# Patient Record
Sex: MALE | Race: WHITE | NOT HISPANIC OR LATINO | URBAN - METROPOLITAN AREA
[De-identification: names, ages, dates, MRNs, and addresses within clinical notes are randomized per-mention and may not be internally consistent; named-entity substitution may affect disease eponyms.]

---

## 2020-04-21 DIAGNOSIS — Z01.84 ANTIBODY RESPONSE EXAMINATION: ICD-10-CM

## 2020-04-22 ENCOUNTER — LAB VISIT (OUTPATIENT)
Dept: LAB | Facility: HOSPITAL | Age: 45
End: 2020-04-22
Attending: INTERNAL MEDICINE
Payer: COMMERCIAL

## 2020-04-22 DIAGNOSIS — Z01.84 ANTIBODY RESPONSE EXAMINATION: ICD-10-CM

## 2020-04-22 LAB — SARS-COV-2 IGG SERPL QL IA: NEGATIVE

## 2020-04-22 PROCEDURE — 86769 SARS-COV-2 COVID-19 ANTIBODY: CPT

## 2020-04-22 PROCEDURE — 36415 COLL VENOUS BLD VENIPUNCTURE: CPT

## 2020-12-23 ENCOUNTER — IMMUNIZATION (OUTPATIENT)
Dept: INTERNAL MEDICINE | Facility: CLINIC | Age: 45
End: 2020-12-23
Payer: COMMERCIAL

## 2020-12-23 DIAGNOSIS — Z23 NEED FOR VACCINATION: ICD-10-CM

## 2020-12-23 PROCEDURE — 91300 COVID-19, MRNA, LNP-S, PF, 30 MCG/0.3 ML DOSE VACCINE: CPT | Mod: ,,, | Performed by: INTERNAL MEDICINE

## 2020-12-23 PROCEDURE — 91300 COVID-19, MRNA, LNP-S, PF, 30 MCG/0.3 ML DOSE VACCINE: ICD-10-PCS | Mod: ,,, | Performed by: INTERNAL MEDICINE

## 2020-12-23 PROCEDURE — 0001A COVID-19, MRNA, LNP-S, PF, 30 MCG/0.3 ML DOSE VACCINE: ICD-10-PCS | Mod: CV19,,, | Performed by: INTERNAL MEDICINE

## 2020-12-23 PROCEDURE — 0001A COVID-19, MRNA, LNP-S, PF, 30 MCG/0.3 ML DOSE VACCINE: CPT | Mod: CV19,,, | Performed by: INTERNAL MEDICINE

## 2021-01-08 ENCOUNTER — LAB VISIT (OUTPATIENT)
Dept: LAB | Facility: HOSPITAL | Age: 46
End: 2021-01-08
Attending: FAMILY MEDICINE
Payer: COMMERCIAL

## 2021-01-08 DIAGNOSIS — E78.5 DYSLIPIDEMIA: ICD-10-CM

## 2021-01-08 PROBLEM — J31.0 CHRONIC RHINITIS: Status: ACTIVE | Noted: 2018-12-18

## 2021-01-08 PROBLEM — E66.9 OBESITY: Status: ACTIVE | Noted: 2019-12-20

## 2021-01-08 LAB
CHOLEST SERPL-MCNC: 211 MG/DL (ref 120–199)
CHOLEST/HDLC SERPL: 5.7 {RATIO} (ref 2–5)
HDLC SERPL-MCNC: 37 MG/DL (ref 40–75)
HDLC SERPL: 17.5 % (ref 20–50)
LDLC SERPL CALC-MCNC: 136.4 MG/DL (ref 63–159)
NONHDLC SERPL-MCNC: 174 MG/DL
TRIGL SERPL-MCNC: 188 MG/DL (ref 30–150)

## 2021-01-08 PROCEDURE — 80061 LIPID PANEL: CPT

## 2021-01-08 PROCEDURE — 36415 COLL VENOUS BLD VENIPUNCTURE: CPT

## 2021-01-14 ENCOUNTER — IMMUNIZATION (OUTPATIENT)
Dept: INTERNAL MEDICINE | Facility: CLINIC | Age: 46
End: 2021-01-14
Payer: COMMERCIAL

## 2021-01-14 DIAGNOSIS — Z23 NEED FOR VACCINATION: ICD-10-CM

## 2021-01-14 PROCEDURE — 0002A COVID-19, MRNA, LNP-S, PF, 30 MCG/0.3 ML DOSE VACCINE: CPT | Mod: PBBFAC | Performed by: INTERNAL MEDICINE

## 2021-01-14 PROCEDURE — 91300 COVID-19, MRNA, LNP-S, PF, 30 MCG/0.3 ML DOSE VACCINE: CPT | Mod: PBBFAC | Performed by: INTERNAL MEDICINE

## 2021-08-26 ENCOUNTER — LAB VISIT (OUTPATIENT)
Dept: INTERNAL MEDICINE | Facility: CLINIC | Age: 46
End: 2021-08-26

## 2021-08-26 DIAGNOSIS — R68.83 CHILLS: Primary | ICD-10-CM

## 2021-08-26 DIAGNOSIS — R10.9 ABDOMINAL CRAMPING: ICD-10-CM

## 2021-08-26 DIAGNOSIS — R68.83 CHILLS: ICD-10-CM

## 2021-08-26 DIAGNOSIS — R50.9 FEVER, UNSPECIFIED FEVER CAUSE: ICD-10-CM

## 2021-08-26 LAB — SARS-COV-2 RDRP RESP QL NAA+PROBE: NEGATIVE

## 2021-08-26 PROCEDURE — U0002 COVID-19 LAB TEST NON-CDC: HCPCS | Performed by: PREVENTIVE MEDICINE

## 2022-01-15 ENCOUNTER — LAB VISIT (OUTPATIENT)
Dept: FAMILY MEDICINE | Facility: CLINIC | Age: 47
End: 2022-01-15
Payer: COMMERCIAL

## 2022-01-15 DIAGNOSIS — Z11.52 ENCOUNTER FOR SCREENING FOR SEVERE ACUTE RESPIRATORY SYNDROME CORONAVIRUS 2 (SARS-COV-2) INFECTION: Primary | ICD-10-CM

## 2022-01-15 LAB
CTP QC/QA: YES
SARS-COV-2 AG RESP QL IA.RAPID: NEGATIVE

## 2022-01-15 PROCEDURE — 87811 SARS-COV-2 COVID19 W/OPTIC: CPT | Mod: S$GLB,,, | Performed by: FAMILY MEDICINE

## 2022-01-15 PROCEDURE — 87811 SARS CORONAVIRUS 2 ANTIGEN POCT, MANUAL READ: ICD-10-PCS | Mod: S$GLB,,, | Performed by: FAMILY MEDICINE

## 2022-01-15 NOTE — PROGRESS NOTES
This test is a lateral flow immunoassay intended for the qualitative detection of nucleocapsid protein antigen from SARS- CoV-2 within the first seven days of symptom onset.  Positive results indicate the presence of viral antigens, but clinical correlation with patient history and other diagnostic information is necessary to determine infection status.  Negative results from patients with symptom onset beyond seven days, should be treated as presumptive and confirmation with a molecular assay, if necessary, for patient management, may be performed. Negative results do not rule out SARS-CoV-2 infection and should not be used as the sole basis for treatment or patient management decisions, including infection control decisions.    This test is only for use under the Food and Drug Administration s Emergency Use Authorization (EUA). Commercial kits are provided by [employer].   _________________________________________________________________   The authorized Fact Sheet for Healthcare Providers and the authorized Fact   Sheet for Patients of the ID NOW COVID-19 are available on the FDA   website:   https://www.fda.gov/media/193104/download  https://www.fda.gov/media/196783/download

## 2024-07-01 ENCOUNTER — OFFICE VISIT (OUTPATIENT)
Dept: DERMATOLOGY | Facility: CLINIC | Age: 49
End: 2024-07-01
Payer: COMMERCIAL

## 2024-07-01 DIAGNOSIS — B07.9 VERRUCA VULGARIS: Primary | ICD-10-CM

## 2024-07-01 DIAGNOSIS — L82.1 SEBORRHEIC KERATOSES: ICD-10-CM

## 2024-07-01 PROCEDURE — 1159F MED LIST DOCD IN RCRD: CPT | Mod: CPTII,S$GLB,, | Performed by: STUDENT IN AN ORGANIZED HEALTH CARE EDUCATION/TRAINING PROGRAM

## 2024-07-01 PROCEDURE — 1160F RVW MEDS BY RX/DR IN RCRD: CPT | Mod: CPTII,S$GLB,, | Performed by: STUDENT IN AN ORGANIZED HEALTH CARE EDUCATION/TRAINING PROGRAM

## 2024-07-01 PROCEDURE — 99999 PR PBB SHADOW E&M-EST. PATIENT-LVL III: CPT | Mod: PBBFAC,,, | Performed by: STUDENT IN AN ORGANIZED HEALTH CARE EDUCATION/TRAINING PROGRAM

## 2024-07-01 PROCEDURE — 99203 OFFICE O/P NEW LOW 30 MIN: CPT | Mod: 25,S$GLB,, | Performed by: STUDENT IN AN ORGANIZED HEALTH CARE EDUCATION/TRAINING PROGRAM

## 2024-07-01 PROCEDURE — 17110 DESTRUCTION B9 LES UP TO 14: CPT | Mod: S$GLB,,, | Performed by: STUDENT IN AN ORGANIZED HEALTH CARE EDUCATION/TRAINING PROGRAM

## 2024-07-01 RX ORDER — SALICYLIC ACID
POWDER (GRAM) MISCELLANEOUS
Qty: 15 G | Refills: 1 | Status: SHIPPED | OUTPATIENT
Start: 2024-07-01

## 2024-07-01 NOTE — PATIENT INSTRUCTIONS
Warts        Nighttime regimen:   Soak finger for 5 minutes in lukewarm water    Either:  Apply salicylic acid 40% solution to affected area (Wart Stick or other prescribed medication). If on the hands or feet, cover with duct tape (3M)   Or:  Curad Mediplast 40% salicylic acid pads    Bandaids are not good because air still gets in   Covering warts helps soften them which helps the medication work better     Daytime regimen:   Remove duct tape   Pare down with a piece of sandpaper that can be thrown away.   DO NOT use stone/file/same paper twice or on any other area of body because this can spread the infection     Expectations:   Warts take a long time to treat    Some of the side effects of in office treatment, such as freezing, include redness and blistering; this is normal   Virus can live in the skin for long periods of time, even if no visible bump remains    Remember warts are contagious    MUST BE CONSISTENT WITH TREATMENT FOR BEST RESULTS         CRYOSURGERY      Your doctor has used a method called cryosurgery to treat your skin condition. Cryosurgery refers to the use of very cold substances to treat a variety of skin conditions such as warts, pre-skin cancers, molluscum contagiosum, sun spots, and several benign growths. The substance we use in cryosurgery is liquid nitrogen and is so cold (-195 degrees Celsius) that is burns when administered.     Following treatment in the office, the skin may immediately burn and become red. You may find the area around the lesion is affected as well. It is sometimes necessary to treat not only the lesion, but a small area of the surrounding normal skin to achieve a good response.     A blister, and even a blood filled blister, may form after treatment.   This is a normal response. If the blister is painful, it is acceptable to sterilize a needle and with rubbing alcohol and gently pop the blister. It is important that you gently wash the area with soap and warm  water as the blister fluid may contain wart virus if a wart was treated. Do no remove the roof of the blister.     The area treated can take anywhere from 1-3 weeks to heal. Healing time depends on the kind skin lesion treated, the location, and how aggressively the lesion was treated. It is recommended that the areas treated are covered with Vaseline or bacitracin ointment and a band-aid. If a band-aid is not practical, just ointment applied several times per day will do. Keeping these areas moist will speed the healing time.    Treatment with liquid nitrogen can leave a scar. In dark skin, it may be a light or dark scar, in light skin it may be a white or pink scar. These will generally fade with time, but may never go away completely.     If you have any concerns after your treatment, please feel free to call the office.       [facility] [place] [address]/ [phone] FAX/ [email or web address]           Your [facility]  They are located in [place] at [address] just before the [address].  If you have any questions, please call the pharmacy at [phone].  Website: [email or web address]

## 2024-07-01 NOTE — PROGRESS NOTES
Subjective:      Patient ID:  Ash Hastings is a 49 y.o. male who presents for   Chief Complaint   Patient presents with    Warts     Warts - Initial  Affected locations: left hand and right hand  Duration: 3 months  Signs / symptoms: pain and peeling      Review of Systems   Hematologic/Lymphatic: Does not bruise/bleed easily.       Objective:   Physical Exam   Constitutional: He appears well-developed and well-nourished.   Neurological: He is alert and oriented to person, place, and time.   Psychiatric: He has a normal mood and affect.   Skin:   Areas Examined (abnormalities noted in diagram):   RUE Inspected  Nails and Digits Inspection Performed                Diagram Legend     Erythematous scaling macule/papule c/w actinic keratosis       Vascular papule c/w angioma      Pigmented verrucoid papule/plaque c/w seborrheic keratosis      Yellow umbilicated papule c/w sebaceous hyperplasia      Irregularly shaped tan macule c/w lentigo     1-2 mm smooth white papules consistent with Milia      Movable subcutaneous cyst with punctum c/w epidermal inclusion cyst      Subcutaneous movable cyst c/w pilar cyst      Firm pink to brown papule c/w dermatofibroma      Pedunculated fleshy papule(s) c/w skin tag(s)      Evenly pigmented macule c/w junctional nevus     Mildly variegated pigmented, slightly irregular-bordered macule c/w mildly atypical nevus      Flesh colored to evenly pigmented papule c/w intradermal nevus       Pink pearly papule/plaque c/w basal cell carcinoma      Erythematous hyperkeratotic cursted plaque c/w SCC      Surgical scar with no sign of skin cancer recurrence      Open and closed comedones      Inflammatory papules and pustules      Verrucoid papule consistent consistent with wart     Erythematous eczematous patches and plaques     Dystrophic onycholytic nail with subungual debris c/w onychomycosis     Umbilicated papule    Erythematous-base heme-crusted tan verrucoid plaque consistent with  inflamed seborrheic keratosis     Erythematous Silvery Scaling Plaque c/w Psoriasis     See annotation      Assessment / Plan:        Verruca vulgaris  -     salicylic acid, bulk, Powd; Compound salicylic acid 70% + fluorouracil 5% paste. Apply a thin layer to warts qhs and occlude with tape.  Dispense: 15 g; Refill: 1    - Counseled that lesions are caused by a virus and can therefore be spread to other people and other parts of patients body. Explained that they typically resolve over 1-2 years but treatment can hasten resolution. Treatment options include topicals (imiquimod, fluorouracil, salicylic acid, retinoids, podophyllin, cidofovir), destruction (cryotherapy, ED&C, cantharidin, laser), immunologic injections (candida, flu vaccine)   -  topical as above covered with duct tape or bandage nightly   -  Soak in warm water for 5 minutes then pare down with sandpaper (use each piece only once)     Cryosurgery procedure note:    Verbal consent from the patient is obtained including, but not limited to, risk of hypopigmentation/hyperpigmentation, scar, recurrence of lesion. Liquid nitrogen cryosurgery is applied to 2 verruca with prior paring, as detailed in the physical exam, to produce a freeze injury. 3 consecutive freeze thaw cycles are applied to each verruca. The patient is aware that blisters (possibly blood blisters) may form.      Seborrheic keratoses  Reassurance given to patient. No treatment is necessary.   Treatment of benign, asymptomatic lesions may be considered cosmetic.             Follow up in about 3 weeks (around 7/22/2024).

## 2025-03-21 ENCOUNTER — LAB VISIT (OUTPATIENT)
Dept: LAB | Facility: HOSPITAL | Age: 50
End: 2025-03-21
Attending: FAMILY MEDICINE
Payer: COMMERCIAL

## 2025-03-21 DIAGNOSIS — E78.00 HIGH CHOLESTEROL: ICD-10-CM

## 2025-03-21 LAB
ALBUMIN SERPL BCP-MCNC: 4.2 G/DL (ref 3.5–5.2)
ALP SERPL-CCNC: 78 U/L (ref 40–150)
ALT SERPL W/O P-5'-P-CCNC: 33 U/L (ref 10–44)
ANION GAP SERPL CALC-SCNC: 8 MMOL/L (ref 8–16)
AST SERPL-CCNC: 24 U/L (ref 10–40)
BILIRUB SERPL-MCNC: 0.9 MG/DL (ref 0.1–1)
BUN SERPL-MCNC: 23 MG/DL (ref 6–20)
CALCIUM SERPL-MCNC: 9.2 MG/DL (ref 8.7–10.5)
CHLORIDE SERPL-SCNC: 105 MMOL/L (ref 95–110)
CHOLEST SERPL-MCNC: 222 MG/DL (ref 120–199)
CHOLEST/HDLC SERPL: 5.4 {RATIO} (ref 2–5)
CO2 SERPL-SCNC: 26 MMOL/L (ref 23–29)
CREAT SERPL-MCNC: 1.1 MG/DL (ref 0.5–1.4)
EST. GFR  (NO RACE VARIABLE): >60 ML/MIN/1.73 M^2
GLUCOSE SERPL-MCNC: 90 MG/DL (ref 70–110)
HDLC SERPL-MCNC: 41 MG/DL (ref 40–75)
HDLC SERPL: 18.5 % (ref 20–50)
LDLC SERPL CALC-MCNC: 153.6 MG/DL (ref 63–159)
NONHDLC SERPL-MCNC: 181 MG/DL
POTASSIUM SERPL-SCNC: 4.3 MMOL/L (ref 3.5–5.1)
PROT SERPL-MCNC: 7.8 G/DL (ref 6–8.4)
SODIUM SERPL-SCNC: 139 MMOL/L (ref 136–145)
TRIGL SERPL-MCNC: 137 MG/DL (ref 30–150)
TSH SERPL DL<=0.005 MIU/L-ACNC: 3.98 UIU/ML (ref 0.4–4)

## 2025-03-21 PROCEDURE — 80053 COMPREHEN METABOLIC PANEL: CPT | Performed by: FAMILY MEDICINE

## 2025-03-21 PROCEDURE — 80061 LIPID PANEL: CPT | Performed by: FAMILY MEDICINE

## 2025-03-21 PROCEDURE — 83695 ASSAY OF LIPOPROTEIN(A): CPT | Performed by: FAMILY MEDICINE

## 2025-03-21 PROCEDURE — 82172 ASSAY OF APOLIPOPROTEIN: CPT | Performed by: FAMILY MEDICINE

## 2025-03-21 PROCEDURE — 84443 ASSAY THYROID STIM HORMONE: CPT | Performed by: FAMILY MEDICINE

## 2025-03-22 LAB — APO B SERPL-MCNC: 121 MG/DL

## 2025-03-24 LAB — LPA SERPL-MCNC: 6 MG/DL (ref 0–30)

## 2025-04-23 ENCOUNTER — PATIENT MESSAGE (OUTPATIENT)
Dept: SPORTS MEDICINE | Facility: CLINIC | Age: 50
End: 2025-04-23
Payer: COMMERCIAL

## 2025-04-23 DIAGNOSIS — M25.511 RIGHT SHOULDER PAIN, UNSPECIFIED CHRONICITY: Primary | ICD-10-CM

## 2025-04-28 ENCOUNTER — OFFICE VISIT (OUTPATIENT)
Dept: SPORTS MEDICINE | Facility: CLINIC | Age: 50
End: 2025-04-28
Payer: COMMERCIAL

## 2025-04-28 VITALS — BODY MASS INDEX: 31.66 KG/M2 | WEIGHT: 226.94 LBS

## 2025-04-28 DIAGNOSIS — M25.311 SHOULDER INSTABILITY, RIGHT: Primary | ICD-10-CM

## 2025-04-28 PROCEDURE — 3008F BODY MASS INDEX DOCD: CPT | Mod: CPTII,S$GLB,, | Performed by: STUDENT IN AN ORGANIZED HEALTH CARE EDUCATION/TRAINING PROGRAM

## 2025-04-28 PROCEDURE — 1160F RVW MEDS BY RX/DR IN RCRD: CPT | Mod: CPTII,S$GLB,, | Performed by: STUDENT IN AN ORGANIZED HEALTH CARE EDUCATION/TRAINING PROGRAM

## 2025-04-28 PROCEDURE — 1159F MED LIST DOCD IN RCRD: CPT | Mod: CPTII,S$GLB,, | Performed by: STUDENT IN AN ORGANIZED HEALTH CARE EDUCATION/TRAINING PROGRAM

## 2025-04-28 PROCEDURE — 99999 PR PBB SHADOW E&M-EST. PATIENT-LVL III: CPT | Mod: PBBFAC,,, | Performed by: STUDENT IN AN ORGANIZED HEALTH CARE EDUCATION/TRAINING PROGRAM

## 2025-04-28 PROCEDURE — 99204 OFFICE O/P NEW MOD 45 MIN: CPT | Mod: S$GLB,,, | Performed by: STUDENT IN AN ORGANIZED HEALTH CARE EDUCATION/TRAINING PROGRAM

## 2025-04-28 NOTE — PROGRESS NOTES
Subjective:          Chief Complaint: Ash Hastings is a 50 y.o. male who had concerns including Pain of the Right Shoulder.    CC:  right Shoulder Pain    HPI 04/28/2025:    CHIEF COMPLAINT:- Right shoulder pain    HPI:Client presents with right shoulder pain that started gradually two weeks ago without a specific incident or injury. Pain was initially more severe, described as significantly inflamed and tender, with a severity of 6-8/10 with certain movements. Currently, pain has improved but persists, rated as 1-2/10 at rest and 4-5/10 at its worst.He reports pain increases during specific exercises, particularly flat bench press, incline bench press, and shoulder routines. He reports weakness in the affected arm, stating he cannot lift the same weight as before on the affected side. Pull-down motions cause discomfort, but he can complete the repetitions despite the pain. However, he cannot continue through the pain during bench press or  press exercises. He also notes difficulty with overhead movements, which cause tenderness and weakness.Daily activities are generally manageable as long as he keeps his elbow down. Sleep is affected only if he lies on the affected side. He experienced a single episode of tingling in his right hand, lasting 2-3 minutes and resolving with movement. 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 He recently started lifting heavier weights at the gym, reducing his repetitions from 10-12 to 6-8.    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 Yes    Interfere with ADLs? Yes      Past Medical History:   Diagnosis Date    Chronic rhinitis 12/18/2018       Medications Ordered Prior to Encounter[1]    Past Surgical History:   Procedure Laterality Date    ADENOIDECTOMY      CYST REMOVAL Left     left thumb    TONSILLECTOMY      VASECTOMY         Family History   Problem Relation Name Age of Onset    Hyperlipidemia Father         Social History[2]     Review of Systems   Constitutional: Negative.   HENT: Negative.     Eyes: Negative.    Cardiovascular: Negative.    Respiratory: Negative.     Endocrine: Negative.    Hematologic/Lymphatic: Negative.    Skin: Negative.    Musculoskeletal:  Positive for joint pain (right shoulder) and muscle weakness. Negative for arthritis, falls and myalgias.   Neurological: Negative.    Psychiatric/Behavioral: Negative.     Allergic/Immunologic: Negative.                    Objective:        General: Ash is well-developed, well-nourished, appears stated age, in no acute distress, alert and oriented to time, place and person.     General    Nursing note and vitals reviewed.  Constitutional: He is oriented to person, place, and time. He appears well-developed and well-nourished. No distress.   HENT:   Head: Normocephalic and atraumatic.   Nose: Nose normal.   Eyes: EOM are normal.   Cardiovascular:  Intact distal pulses.            Pulmonary/Chest: Effort normal. No respiratory distress.   Neurological: He is alert and oriented to person, place, and time.   Psychiatric: He has a normal mood and affect. His behavior is normal. Judgment and thought content normal.         Right Shoulder Exam     Inspection/Observation   Swelling: absent  Bruising: absent  Scars: absent  Deformity: absent  Scapular  Winging: absent  Scapular Dyskinesia: negative  Atrophy: absent    Tenderness   The patient is tender to palpation of the greater tuberosity, biceps tendon and acromioclavicular joint.    Range of Motion   Active abduction:  170   Passive abduction:  170   Forward Flexion:  170   Forward Elevation: 170  External Rotation 0 degrees:  50   Internal rotation 0 degrees:  Lumbar     Tests & Signs   Apprehension: positive  Cross arm: positive  Parish test: positive  Impingement: positive  Sulcus: absent  Lift Off Sign: negative  Belly Press: negative  Active Compression Test ([place]'s Sign): positive  Speed's Test: positive  Anterior Drawer Test: 0   Posterior Drawer Test: 1+  Relocation 90 degrees: postive  Relocation > 90 degrees: positive  Bear Hug: negative  Jerk Test: postive    Other   Sensation: normal    Left Shoulder Exam     Inspection/Observation   Swelling: absent  Bruising: absent  Scars: absent  Deformity: absent  Scapular Winging: absent  Scapular Dyskinesia: negative  Atrophy: absent    Tenderness   The patient is experiencing no tenderness.     Range of Motion   Active abduction:  170   Passive abduction:  170   Forward Flexion:  180   Forward Elevation: 180  External Rotation 0 degrees:  60   Internal rotation 0 degrees:  Mid thoracic     Tests & Signs   Apprehension: negative  Sulcus: absent  Anterior Drawer Test: 0  Posterior Drawer Test: 0  Relocation 90 degrees: negative  Relocation > 90 degrees: negative  Jerk Test: negative    Other   Sensation: normal       Muscle Strength   Right Upper Extremity   Shoulder Abduction: 5/5   Shoulder Internal Rotation: 5/5   Shoulder External Rotation: 4/5   Supraspinatus: 5/5   Subscapularis: 5/5   Biceps: 5/5   Left Upper Extremity  Shoulder Abduction: 5/5   Shoulder Internal Rotation: 5/5   Shoulder External Rotation: 5/5   Supraspinatus: 5/5   Subscapularis: 5/5   Biceps: 5/5     Vascular Exam     Right Pulses      Radial:                    2+      Left  Pulses      Radial:                    2+      Capillary Refill  Right Hand: normal capillary refill  Left Hand: normal capillary refill          Imaging:   X-rays of the right shoulder from 04/28/2025 personally viewed by me on that day these include AP, Grashey, scapular Y, axillary.  Glenohumeral joint is reduced.  No fracture.  No bony abnormality.  Minimal AC joint arthritic change.      Assessment:     Ash Hastings is a 50 y.o. male with right posterior shoulder instability, likely SLAP tear with posterior extension  Encounter Diagnosis   Name Primary?    Shoulder instability, right Yes          Plan:       Assessment & Plan    REFERRALS:  - Referred to PT for shoulder strengthening exercises.    PROCEDURES:  - Reviewed XRs of the right shoulder.  - Client declines MRI at this time.    FOLLOW UP:  - Follow up in 6-8 weeks to assess progress.  - Contact the office to specify preferred PT location.    PATIENT INSTRUCTIONS:  - Avoid bench press, push-ups, and fly exercises.  - Limit overhead arm movements.  - Continue bicep and tricep exercises with arms by side.  - Perform pulldown motions if not too painful.  - When moving patients at work, focus on pulling rather than pushing motions.         This note was generated with the assistance of ambient listening technology. Verbal consent was obtained by the patient and accompanying visitor(s) for the recording of patient appointment to facilitate this note. I attest to having reviewed and edited the generated note for accuracy, though some syntax or spelling errors may persist. Please contact the author of this note for any clarification.                           [1]  Current Outpatient Medications on File Prior to Visit   Medication Sig Dispense Refill    fluticasone (FLONASE) 50 mcg/actuation nasal spray 1 spray by Each Nare route once daily.      salicylic acid, bulk, Powd Compound salicylic acid 70% + fluorouracil 5% paste. Apply a thin layer to warts qhs  and occlude with tape. 15 g 1     No current facility-administered medications on file prior to visit.   [2]  Social History  Socioeconomic History    Marital status:    Tobacco Use    Smoking status: Never   Substance and Sexual Activity    Alcohol use: Yes     Alcohol/week: 0.0 standard drinks of alcohol     Comment: 3 beers per week    Drug use: No   Social History Narrative     in 2000.  Son and daughter.  Works as RN at [employer].  Grew up in [place].     Social Drivers of Health     Financial Resource Strain: Low Risk  (6/30/2024)    Overall Financial Resource Strain (CARDIA)     Difficulty of Paying Living Expenses: Not very hard   Food Insecurity: No Food Insecurity (6/30/2024)    Hunger Vital Sign     Worried About Running Out of Food in the Last Year: Never true     Ran Out of Food in the Last Year: Never true   Physical Activity: Insufficiently Active (6/30/2024)    Exercise Vital Sign     Days of Exercise per Week: 3 days     Minutes of Exercise per Session: 40 min   Stress: No Stress Concern Present (6/30/2024)    [place] [employer] - Occupational Stress Questionnaire     Feeling of Stress : Only a little   Housing Stability: Unknown (6/30/2024)    Housing Stability Vital Sign     Unable to Pay for Housing in the Last Year: No

## 2025-04-29 DIAGNOSIS — M25.311 SHOULDER INSTABILITY, RIGHT: Primary | ICD-10-CM

## 2025-05-02 ENCOUNTER — CLINICAL SUPPORT (OUTPATIENT)
Dept: REHABILITATION | Facility: HOSPITAL | Age: 50
End: 2025-05-02
Attending: STUDENT IN AN ORGANIZED HEALTH CARE EDUCATION/TRAINING PROGRAM
Payer: COMMERCIAL

## 2025-05-02 DIAGNOSIS — M25.311 SHOULDER INSTABILITY, RIGHT: ICD-10-CM

## 2025-05-02 PROCEDURE — 97110 THERAPEUTIC EXERCISES: CPT

## 2025-05-02 PROCEDURE — 97161 PT EVAL LOW COMPLEX 20 MIN: CPT

## 2025-05-02 PROCEDURE — 97112 NEUROMUSCULAR REEDUCATION: CPT

## 2025-05-02 NOTE — PROGRESS NOTES
Outpatient Rehab    Physical Therapy Evaluation    Patient Name: Ash Hastings  MRN: [id number]  YOB: 1975  Encounter Date: 5/2/2025    Therapy Diagnosis:   Encounter Diagnosis   Name Primary?    Shoulder instability, right      Physician: Alex Smith MD    Physician Orders: Eval and Treat  Medical Diagnosis: Shoulder instability, right    Visit # / Visits Authorized:  1 / 1  Insurance Authorization Period: 4/29/2025 to 4/29/2026  Date of Evaluation: 5/2/2025  Plan of Care Certification: 5/2/2025 to 6/27/25     Time In: 0805   Time Out: 0855  Total Time: 50   Total Billable Time:      Intake Outcome Measure for FOTO Survey    Therapist reviewed FOTO scores for Ash Hastings on 5/2/2025.   FOTO report - see Media section or FOTO account episode details.     Intake Score: 63%         Subjective   History of Present Illness  Ash is a 50 y.o. male who reports to physical therapy with a chief concern of R Shoulder Pain.         Diagnostic tests related to this condition: X-ray.        History of Present Condition/Illness: Pt reports R shoulder pain starting 3 mos prior which he noticed while in the gym and went away a week later. He notes the pain comes on with pushing and OH press. His symptoms starting coming on quicker with lighter loads. Recently, he did heavier weights, but the next day he had a lot of pain. His symptoms have improved, but he kept his appt with [facility] to get his shoulder evaluated. Pt referred to PT to improve R shoulder symptoms.     Pain     Patient reports a current pain level of 0/10. Pain at best is reported as 0/10. Pain at worst is reported as 6/10.   Location: R anterior shoulder and posterior tenderness  Clinical Progression (since onset): Stable  Pain Qualities: Tenderness, Aching  Pain-Relieving Factors: Medications - over-the-counter  Pain-Aggravating Factors: Exercise         Employment  Patient does not report that: Does the patient's condition impact their ability to  work?  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    Ash Hastings  has a past surgical history that includes Tonsillectomy; Adenoidectomy; Vasectomy; and Cyst Removal (Left).    Ash has a current medication list which includes the following prescription(s): fluticasone propionate and salicylic acid (bulk).    Review of patient's allergies indicates:  No Known Allergies     Objective   Posture          Right scapula is: Depressed and Protracted              Shoulder Range of Motion  Right Shoulder   Active (deg) Passive (deg) Pain   Flexion 180 180     Extension 20 20     Scaption         ABduction 180 180     ADduction         Horizontal ABduction         Horizontal ADduction         External Rotation (Shoulder ABducted 0 degrees)         External Rotation (Shoulder ABducted 45 degrees)         External Rotation (Shoulder ABducted 90 degrees) 100 110     Internal Rotation (Shoulder ABducted 0 degrees)         Internal Rotation (Shoulder ABducted 45 degrees)         Internal Rotation (Shoulder ABducted 90 degrees) 80 80       Left Shoulder   Active (deg) Passive (deg) Pain   Flexion 180 180     Extension 20 20     Scaption         ABduction 180 180     ADduction         Horizontal ABduction         Horizontal ADduction         External Rotation (Shoulder ABducted 0 degrees)         External Rotation (Shoulder ABducted 45 degrees)         External Rotation (Shoulder ABducted 90 degrees) 90 90     Internal Rotation (Shoulder ABducted 0 degrees)         Internal Rotation (Shoulder ABducted 45 degrees)         Internal Rotation (Shoulder ABducted 90 degrees) 80 80              Elbow/Forearm Range of Motion   Right Elbow/Forearm   Active (deg) Passive (deg) Pain   Flexion 120 120     Extension 0 0     Forearm Pronation         Forearm Supination           Left Elbow/Forearm   Active (deg) Passive (deg)  Pain   Flexion 120 120     Extension 0 0     Forearm Pronation         Forearm Supination                       Shoulder Strength - Planes of Motion   Right Strength Right Pain Left Strength Left  Pain   Flexion 5   5     Extension           ABduction 5   5     ADduction           Horizontal ABduction           Horizontal ADduction           Internal Rotation 0° 5   5     Internal Rotation 90°           External Rotation 0° 4+   5     External Rotation 90°               Shoulder Strength - Scapular Stabilizing Muscles   Right Strength Right Pain Left Strength Left  Pain   Lower Trapezius 3+   4     Middle Trapezius 4   4+     Rhomboids                       Shoulder Special Tests  Positive: Right [place]'s  Rotator Cuff Tests  Negative: Right Belly Press and Right Drop Arm  Positive: Right Lift Off  Negative: Right Full Can and Right [place]'s  Impingement Tests  Negative: Right [place]as and Right Neer's              Treatment:  Therapeutic Exercise  TE 1: FOTO  TE 2: Pt education and HEP review  Balance/Neuromuscular Re-Education  NMR 1: SA foam roller on wall ytb 2x10 with 5 sec hold  NMR 2: LT lift offs with foam x10 with 3 sec hold B  NMR 3: wall clock ytb x10 B    Time Entry(in minutes):  PT Evaluation (Low) Time Entry: 25  Neuromuscular Re-Education Time Entry: 15  Therapeutic Exercise Time Entry: 10    Assessment & Plan   Assessment  Ash presents with a condition of Low complexity.   Presentation of Symptoms: Stable            Patient Goal for Therapy (PT): Return to lifting painfree  Prognosis: Good  Assessment Details: Pt presents with excessive R ER ROM, +Medina's, scapular and ER weakness  causing compensations with OH lifting resulting in pain.     Plan  From a physical therapy perspective, the patient would benefit from: Skilled Rehab Services    Planned therapy interventions include: Therapeutic exercise, Therapeutic activities, Neuromuscular re-education, and Manual therapy.    Planned  modalities to include: Biofeedback, Electrical stimulation - attended, Electrical stimulation - passive/unattended, Thermotherapy (hot pack), and Cryotherapy (cold pack).        Visit Frequency: 1 times Per Week for 8 Weeks.       This plan was discussed with Patient.   Discussion participants: Agreed Upon Plan of Care             Patient's spiritual, cultural, and educational needs considered and patient agreeable to plan of care and goals.           Goals:   Active       Functional outcome       Patient stated goal: Return to lifting painfree        Start:  05/02/25    Expected End:  06/27/25            Patient will demonstrate independence in home program for support of progression       Start:  05/02/25    Expected End:  05/30/25               Lifting & carrying objects       Patient will lift 30 lbs OH painfree       Start:  05/02/25    Expected End:  06/27/25               Pain       Patient will report pain of 1/10 demonstrating a reduction of overall pain as worst       Start:  05/02/25    Expected End:  06/27/25               Strength       Patient will achieve bilateral shoulder mid and lower trap strength of 5/5       Start:  05/02/25    Expected End:  06/27/25            Patient will achieve right shoulder external rotation strength of 5/5 in neutral       Start:  05/02/25    Expected End:  06/27/25                Viri Chan, PT

## 2025-05-08 ENCOUNTER — CLINICAL SUPPORT (OUTPATIENT)
Dept: REHABILITATION | Facility: HOSPITAL | Age: 50
End: 2025-05-08
Payer: COMMERCIAL

## 2025-05-08 DIAGNOSIS — M25.311 SHOULDER INSTABILITY, RIGHT: Primary | ICD-10-CM

## 2025-05-08 PROCEDURE — 97530 THERAPEUTIC ACTIVITIES: CPT

## 2025-05-08 PROCEDURE — 97112 NEUROMUSCULAR REEDUCATION: CPT

## 2025-05-08 NOTE — PROGRESS NOTES
Outpatient Rehab    Physical Therapy Visit    Patient Name: Ash Hastings  MRN: [id number]  YOB: 1975  Encounter Date: 5/8/2025    Therapy Diagnosis:   Encounter Diagnosis   Name Primary?    Shoulder instability, right Yes     Physician: Alex Smith MD    Physician Orders: Eval and Treat  Medical Diagnosis: Shoulder instability, right    Visit # / Visits Authorized:  1 / 20  Insurance Authorization Period: 5/2/2025 to 12/31/2025  Date of Evaluation: 5/2/25  Plan of Care Certification: 5/2/25 to 6/27/25      Time In: 0850   Time Out: 0955  Total Time (in minutes): 65   Total Billable Time (in minutes):      FOTO:  Intake Score:  %  Survey Score 2:  %  Survey Score 3:  %         Subjective   Pt reports no issues with HEP..         Objective            Treatment:    Ash participated in neuromuscular re-education activities to improve: Coordination, Kinesthetic, Sense, Proprioception, and Posture for 35 minutes. The following activities were included:  SA foam roller ytb 2x10 with 5 sec hold  Lower trap lifts off foam in colleen pose 3x10 with 3 sec hold B  Seated IR dbl gtb 3x10 to 5 sec tempo B  Stir the pot with SB x10 cw and ccw x5 sets    Ash participated in dynamic functional therapeutic activities to improve functional performance for 30  minutes, including:  UBE 4'/4'  Reverse bear crawls x1 lap  Waiters carry 5# kb upside down hold x3 laps on turf B  Pt education and HEP review      Time Entry(in minutes):  Neuromuscular Re-Education Time Entry: 35  Therapeutic Activity Time Entry: 30    Assessment & Plan   Assessment: Pt tolerated new interventions well to progress scapular strengthening to promote R shoulder stability. He was educated to continue gym workouts for BUE just keeping exercises below 90 degs.       Patient will continue to benefit from skilled outpatient physical therapy to address the deficits listed in the problem list box on initial evaluation, provide pt/family education  and to maximize pt's level of independence in the home and community environment.     Patient's spiritual, cultural, and educational needs considered and patient agreeable to plan of care and goals.           Plan: Continue POC per pt tolerance.    Goals:   Active       Functional outcome       Patient stated goal: Return to lifting painfree        Start:  05/02/25    Expected End:  06/27/25            Patient will demonstrate independence in home program for support of progression       Start:  05/02/25    Expected End:  05/30/25               Lifting & carrying objects       Patient will lift 30 lbs OH painfree       Start:  05/02/25    Expected End:  06/27/25               Pain       Patient will report pain of 1/10 demonstrating a reduction of overall pain as worst       Start:  05/02/25    Expected End:  06/27/25               Strength       Patient will achieve bilateral shoulder mid and lower trap strength of 5/5       Start:  05/02/25    Expected End:  06/27/25            Patient will achieve right shoulder external rotation strength of 5/5 in neutral       Start:  05/02/25    Expected End:  06/27/25                Viri Chan, PT

## 2025-05-15 ENCOUNTER — CLINICAL SUPPORT (OUTPATIENT)
Dept: REHABILITATION | Facility: HOSPITAL | Age: 50
End: 2025-05-15
Payer: COMMERCIAL

## 2025-05-15 DIAGNOSIS — M25.311 SHOULDER INSTABILITY, RIGHT: Primary | ICD-10-CM

## 2025-05-15 PROCEDURE — 97530 THERAPEUTIC ACTIVITIES: CPT

## 2025-05-15 PROCEDURE — 97112 NEUROMUSCULAR REEDUCATION: CPT

## 2025-05-15 NOTE — PROGRESS NOTES
"  Outpatient Rehab    Physical Therapy Visit    Patient Name: Ash Hastings  MRN: [id number]  YOB: 1975  Encounter Date: 5/15/2025    Therapy Diagnosis:   Encounter Diagnosis   Name Primary?    Shoulder instability, right Yes     Physician: Alex Smith MD    Physician Orders: Eval and Treat  Medical Diagnosis: Shoulder instability, right    Visit # / Visits Authorized:  2 / 20  Insurance Authorization Period: 5/2/2025 to 12/31/2025  Date of Evaluation: 5/2/25  Plan of Care Certification: 5/2/25 to 6/27/25      Time In: 0800   Time Out: 0855  Total Time (in minutes): 55   Total Billable Time (in minutes):      FOTO:  Intake Score:  %  Survey Score 2:  %  Survey Score 3:  %         Subjective   Pt notes being more aware of scapular positioning with work outs. Would like to try bow again..         Objective            Treatment:    Ash participated in neuromuscular re-education activities to improve: Coordination, Kinesthetic, Sense, Proprioception, and Posture for 30 minutes. The following activities were included:  SA foam roller ytb 2x10 with 5 sec hold  Lower trap lifts off foam in colleen pose 3x10 with 3 sec hold B-np  Seated IR dbl gtb 3x10 to 5 sec tempo B-np  Stir the pot with SB x10 cw and ccw x5 sets  Shoulder I's, T"s and Y's RUE gtb 2x10  90/90 shoulder step out with rotation gtb 2x10  1/2 foam pec mobility x5 min    Ash participated in dynamic functional therapeutic activities to improve functional performance for 25  minutes, including:  UBE 4'/4'-np  Ball on wall shoulder flexion 2x10 with 5 sec hold  Reverse bear crawls x2 laps  Waiters carry 10# kb upside down hold x3 laps on turf B  Pt education and HEP review      Time Entry(in minutes):  Neuromuscular Re-Education Time Entry: 35  Therapeutic Activity Time Entry: 25    Assessment & Plan   Assessment: Pt was able to tolerate more load while maintaing posterior scapular tilt well. Good fatigue noted throughout treatment and plan to " progress strengthening as tolerated.       Patient will continue to benefit from skilled outpatient physical therapy to address the deficits listed in the problem list box on initial evaluation, provide pt/family education and to maximize pt's level of independence in the home and community environment.     Patient's spiritual, cultural, and educational needs considered and patient agreeable to plan of care and goals.           Plan: Continue POC per pt tolerance.    Goals:   Active       Functional outcome       Patient stated goal: Return to lifting painfree        Start:  05/02/25    Expected End:  06/27/25            Patient will demonstrate independence in home program for support of progression       Start:  05/02/25    Expected End:  05/30/25               Lifting & carrying objects       Patient will lift 30 lbs OH painfree       Start:  05/02/25    Expected End:  06/27/25               Pain       Patient will report pain of 1/10 demonstrating a reduction of overall pain as worst       Start:  05/02/25    Expected End:  06/27/25               Strength       Patient will achieve bilateral shoulder mid and lower trap strength of 5/5       Start:  05/02/25    Expected End:  06/27/25            Patient will achieve right shoulder external rotation strength of 5/5 in neutral       Start:  05/02/25    Expected End:  06/27/25                Viri Chan, PT

## 2025-05-20 ENCOUNTER — CLINICAL SUPPORT (OUTPATIENT)
Dept: REHABILITATION | Facility: HOSPITAL | Age: 50
End: 2025-05-20
Payer: COMMERCIAL

## 2025-05-20 DIAGNOSIS — M25.311 SHOULDER INSTABILITY, RIGHT: Primary | ICD-10-CM

## 2025-05-20 PROCEDURE — 97530 THERAPEUTIC ACTIVITIES: CPT | Mod: CQ

## 2025-05-20 PROCEDURE — 97112 NEUROMUSCULAR REEDUCATION: CPT | Mod: CQ

## 2025-05-20 NOTE — PROGRESS NOTES
"  Outpatient Rehab    Physical Therapy Visit    Patient Name: Ash Hastings  MRN: [id number]  YOB: 1975  Encounter Date: 5/20/2025    Therapy Diagnosis:   Encounter Diagnosis   Name Primary?    Shoulder instability, right Yes     Physician: Alex Smith MD    Physician Orders: Eval and Treat  Medical Diagnosis: Shoulder instability, right    Visit # / Visits Authorized:  3 / 20  Insurance Authorization Period: 5/2/2025 to 12/31/2025  Date of Evaluation: 5/2/25  Plan of Care Certification: 5/2/25 to 6/27/25      Time In: 1300   Time Out: 1400  Total Time (in minutes): 60   Total Billable Time (in minutes): 60    FOTO:  Intake Score:  %  Survey Score 2:  %  Survey Score 3:  %         Subjective   min pain today, compliant with HEP and still working out at gym modifying activity.  Pain reported as 1/10.      Objective            Treatment:    Ash participated in neuromuscular re-education activities to improve: Coordination, Kinesthetic, Sense, Proprioception, and Posture for 30 minutes. The following activities were included:  SA foam roller ytb 2x10 with 5 sec hold  Lower trap lifts off foam in colleen pose 3x10 with 3 sec hold B-np  Seated IR dbl gtb 3x10 to 5 sec tempo B-np  Stir the pot with SB x10 cw and ccw x5 sets  Shoulder I's, T"s and Y's RUE GTB x10  90/90 shoulder step out with rotation gtb 2x10  1/2 foam pec mobility x5 min    Ash participated in dynamic functional therapeutic activities to improve functional performance for 25  minutes, including:  UBE 5'/5' push/pull   Ball on wall shoulder red - 4 ways 3x30 ea  Reverse bear crawls x2 laps  Waiters carry 10# kb upside down hold x3 laps on turf B  Pt education and HEP review      Time Entry(in minutes):       Assessment & Plan   Assessment: Pt tolerating tx well. continued with load as tolerated maintaining good posture.  VC/TC for correcting form/technique. progress as tolerated.       Patient will continue to benefit from skilled " outpatient physical therapy to address the deficits listed in the problem list box on initial evaluation, provide pt/family education and to maximize pt's level of independence in the home and community environment.     Patient's spiritual, cultural, and educational needs considered and patient agreeable to plan of care and goals.           Plan: Continue POC per pt tolerance.    Goals:   Active       Functional outcome       Patient stated goal: Return to lifting painfree        Start:  05/02/25    Expected End:  06/27/25            Patient will demonstrate independence in home program for support of progression       Start:  05/02/25    Expected End:  05/30/25               Lifting & carrying objects       Patient will lift 30 lbs OH painfree       Start:  05/02/25    Expected End:  06/27/25               Pain       Patient will report pain of 1/10 demonstrating a reduction of overall pain as worst       Start:  05/02/25    Expected End:  06/27/25               Strength       Patient will achieve bilateral shoulder mid and lower trap strength of 5/5       Start:  05/02/25    Expected End:  06/27/25            Patient will achieve right shoulder external rotation strength of 5/5 in neutral       Start:  05/02/25    Expected End:  06/27/25                Mateusz Farfan, PTA, STS

## 2025-05-28 ENCOUNTER — PATIENT MESSAGE (OUTPATIENT)
Dept: SPORTS MEDICINE | Facility: CLINIC | Age: 50
End: 2025-05-28
Payer: COMMERCIAL

## 2025-06-03 ENCOUNTER — TELEPHONE (OUTPATIENT)
Dept: SPORTS MEDICINE | Facility: CLINIC | Age: 50
End: 2025-06-03
Payer: COMMERCIAL

## 2025-06-06 ENCOUNTER — CLINICAL SUPPORT (OUTPATIENT)
Dept: REHABILITATION | Facility: HOSPITAL | Age: 50
End: 2025-06-06
Payer: COMMERCIAL

## 2025-06-06 DIAGNOSIS — M25.311 SHOULDER INSTABILITY, RIGHT: Primary | ICD-10-CM

## 2025-06-06 PROCEDURE — 97112 NEUROMUSCULAR REEDUCATION: CPT

## 2025-06-06 PROCEDURE — 97530 THERAPEUTIC ACTIVITIES: CPT

## 2025-06-11 ENCOUNTER — CLINICAL SUPPORT (OUTPATIENT)
Dept: REHABILITATION | Facility: HOSPITAL | Age: 50
End: 2025-06-11
Payer: COMMERCIAL

## 2025-06-11 DIAGNOSIS — M25.311 SHOULDER INSTABILITY, RIGHT: Primary | ICD-10-CM

## 2025-06-11 PROCEDURE — 97530 THERAPEUTIC ACTIVITIES: CPT

## 2025-06-11 PROCEDURE — 97112 NEUROMUSCULAR REEDUCATION: CPT

## 2025-06-11 NOTE — PROGRESS NOTES
"  Outpatient Rehab    Physical Therapy Progress Note    Patient Name: Ash Hastings  MRN: [id number]  YOB: 1975  Encounter Date: 6/11/2025    Therapy Diagnosis:   Encounter Diagnosis   Name Primary?    Shoulder instability, right Yes     Physician: Alex Smith MD    Physician Orders: Eval and Treat  Medical Diagnosis: Shoulder instability, right    Visit # / Visits Authorized:  5 / 20  Insurance Authorization Period: 5/2/2025 to 12/31/2025  Date of Evaluation: 5/2/2025  Plan of Care Certification:       Time In:     Time Out:    Total Time (in minutes):     Total Billable Time (in minutes):      FOTO:  Intake Score:  %  Survey Score 2:  %  Survey Score 3:  %    Precautions:       Subjective   Pt has follow up with Dr. Smith next week. Was sick last week, so wasn't able to do exercises..         Objective            Treatment:   Ash participated in neuromuscular re-education activities to improve: Coordination, Kinesthetic, Sense, Proprioception, and Posture for 35 minutes. The following activities were included:  SA foam roller gtb 2x10 with 5 sec hold  Lower trap lifts off foam in colleen pose 3x10 with 3 sec hold B-np  Seated IR dbl gtb 3x10 to 5 sec tempo B-np  Stir the pot with SB x10 cw and ccw x5 sets  Shoulder I's, T"s and Y's RUE GTB 3x10  90/90 shoulder step out with rotation gtb 2x15  1/2 foam pec mobility x5 min    Ash participated in dynamic functional therapeutic activities to improve functional performance for 25 minutes, including:  UBE 5'/5' push/pull   Ball on wall shoulder red - 4 ways 3x30 ea-np  Reverse bear crawls x2 laps-np  Waiters carry 10# kb upside down hold x3 laps on turf B  Pt education and HEP review  PT reassessment-np  FOTO-np      Time Entry(in minutes):       Assessment & Plan   Assessment: Pt was able to return to prior interventions to continue progressing mid and lower trap stability to unload anterior shoulder. Plan to continue progressing toward shoulder " strengthening prior to returning to independent pec strengthening that reproduces symptoms.       The patient will continue to benefit from skilled outpatient physical therapy in order to address the deficits listed in the problem list on the initial evaluation, provide patient and family education, and maximize the patients level of independence in the home and community environments.     The patient's spiritual, cultural, and educational needs were considered, and the patient is agreeable to the plan of care and goals.           Plan: Continue POC per pt tolerance.    Goals:   Active       Functional outcome       Patient stated goal: Return to lifting painfree  (Progressing)       Start:  05/02/25    Expected End:  06/27/25            Patient will demonstrate independence in home program for support of progression (Met)       Start:  05/02/25    Expected End:  05/30/25    Resolved:  06/06/25            Lifting & carrying objects       Patient will lift 30 lbs OH painfree (Progressing)       Start:  05/02/25    Expected End:  06/27/25               Pain       Patient will report pain of 1/10 demonstrating a reduction of overall pain as worst (Progressing)       Start:  05/02/25    Expected End:  06/27/25               Strength       Patient will achieve bilateral shoulder mid and lower trap strength of 5/5 (Progressing)       Start:  05/02/25    Expected End:  06/27/25            Patient will achieve right shoulder external rotation strength of 5/5 in neutral       Start:  05/02/25    Expected End:  06/27/25                Viri Chan, PT

## 2025-06-17 ENCOUNTER — OFFICE VISIT (OUTPATIENT)
Dept: SPORTS MEDICINE | Facility: CLINIC | Age: 50
End: 2025-06-17
Payer: COMMERCIAL

## 2025-06-17 VITALS
BODY MASS INDEX: 31.68 KG/M2 | HEIGHT: 71 IN | DIASTOLIC BLOOD PRESSURE: 78 MMHG | HEART RATE: 64 BPM | WEIGHT: 226.31 LBS | SYSTOLIC BLOOD PRESSURE: 117 MMHG

## 2025-06-17 DIAGNOSIS — M25.311 SHOULDER INSTABILITY, RIGHT: Primary | ICD-10-CM

## 2025-06-17 PROCEDURE — 3078F DIAST BP <80 MM HG: CPT | Mod: CPTII,S$GLB,, | Performed by: STUDENT IN AN ORGANIZED HEALTH CARE EDUCATION/TRAINING PROGRAM

## 2025-06-17 PROCEDURE — 3008F BODY MASS INDEX DOCD: CPT | Mod: CPTII,S$GLB,, | Performed by: STUDENT IN AN ORGANIZED HEALTH CARE EDUCATION/TRAINING PROGRAM

## 2025-06-17 PROCEDURE — 3074F SYST BP LT 130 MM HG: CPT | Mod: CPTII,S$GLB,, | Performed by: STUDENT IN AN ORGANIZED HEALTH CARE EDUCATION/TRAINING PROGRAM

## 2025-06-17 PROCEDURE — 99213 OFFICE O/P EST LOW 20 MIN: CPT | Mod: S$GLB,,, | Performed by: STUDENT IN AN ORGANIZED HEALTH CARE EDUCATION/TRAINING PROGRAM

## 2025-06-17 PROCEDURE — 1160F RVW MEDS BY RX/DR IN RCRD: CPT | Mod: CPTII,S$GLB,, | Performed by: STUDENT IN AN ORGANIZED HEALTH CARE EDUCATION/TRAINING PROGRAM

## 2025-06-17 PROCEDURE — 99999 PR PBB SHADOW E&M-EST. PATIENT-LVL III: CPT | Mod: PBBFAC,,, | Performed by: STUDENT IN AN ORGANIZED HEALTH CARE EDUCATION/TRAINING PROGRAM

## 2025-06-17 PROCEDURE — 1159F MED LIST DOCD IN RCRD: CPT | Mod: CPTII,S$GLB,, | Performed by: STUDENT IN AN ORGANIZED HEALTH CARE EDUCATION/TRAINING PROGRAM

## 2025-06-17 NOTE — PROGRESS NOTES
Subjective:          Chief Complaint: Ash Hastings is a 50 y.o. male who had concerns including Pain of the Right Shoulder and Follow-up.    CC:  right Shoulder Pain    HPI 06/17/2025    CHIEF COMPLAINT:- Right shoulder pain    HPI:Client presents for follow-up regarding his right shoulder. He reports gradual improvement, rating it as approximately 50% better compared to his last visit a few weeks ago. The pinching sensation in his shoulder occurs less frequently. He no longer experiences discomfort during wall presses or modified push-ups on his knees, which he previously experienced. He has not attempted lateral or overhead presses yet, following recommendations from XR results and PT. He tests the shoulder with light activities without discomfort, indicating progress.He continues with PT once a week and performs routine exercises 4-5 times per week. He expresses some frustration with the slow pace of recovery but acknowledges the importance of not straining or overly testing the shoulder to avoid regression.He denies any regression in symptoms.    PREVIOUS TREATMENTS:- PT: Once a week and performing a routine 4-5 times per week at home, provided moderate benefit with approximately 50% improvement.      HPI 04/28/2025:    CHIEF COMPLAINT:- Right shoulder pain    HPI:Client presents with right shoulder pain that started gradually two weeks ago without a specific incident or injury. Pain was initially more severe, described as significantly inflamed and tender, with a severity of 6-8/10 with certain movements. Currently, pain has improved but persists, rated as 1-2/10 at rest and 4-5/10 at its worst.He reports pain increases during specific exercises, particularly flat bench press, incline bench press, and shoulder routines. He reports weakness in the affected arm, stating he cannot lift the same weight as before on the affected side. Pull-down motions cause discomfort, but he can complete the repetitions despite the  pain. However, he cannot continue through the pain during bench press or  press exercises. He also notes difficulty with overhead movements, which cause tenderness and weakness.Daily activities are generally manageable as long as he keeps his elbow down. Sleep is affected only if he lies on the affected side. He experienced a single episode of tingling in his right hand, lasting 2-3 minutes and resolving with movement. 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 He recently started lifting heavier weights at the gym, reducing his repetitions from 10-12 to 6-8.    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 Yes    Interfere with ADLs? Yes      Pain  Pertinent negatives include no myalgias.   Follow-up  Pertinent negatives include no myalgias.     Past Medical History:   Diagnosis Date    Chronic rhinitis 12/18/2018       Medications Ordered Prior to Encounter[1]    Past Surgical History:   Procedure Laterality Date    ADENOIDECTOMY      CYST REMOVAL Left     left thumb    TONSILLECTOMY      VASECTOMY         Family History   Problem Relation Name Age of Onset    Hyperlipidemia Father         Social History[2]     Review of Systems   Constitutional: Negative.   HENT: Negative.     Eyes: Negative.    Cardiovascular: Negative.    Respiratory:  Negative.     Endocrine: Negative.    Hematologic/Lymphatic: Negative.    Skin: Negative.    Musculoskeletal:  Negative for arthritis, falls, joint pain (right shoulder), muscle weakness and myalgias.   Neurological: Negative.    Psychiatric/Behavioral: Negative.     Allergic/Immunologic: Negative.        Pain Related Questions  Over the past 3 days, what was your average pain during activity? (I.e. running, jogging, walking, climbing stairs, getting dressed, ect.): 1  Over the past 3 days, what was your highest pain level?: 1  Over the past 3 days, what was your lowest pain level? : 0    Other  How many nights a week are you awakened by your affected body part?: 0  Was the patient's HEIGHT measured or patient reported?: Patient Reported  Was the patient's WEIGHT measured or patient reported?: Measured      Objective:        General: Ash is well-developed, well-nourished, appears stated age, in no acute distress, alert and oriented to time, place and person.     General    Nursing note and vitals reviewed.  Constitutional: He is oriented to person, place, and time. He appears well-developed and well-nourished. No distress.   HENT:   Head: Normocephalic and atraumatic.   Nose: Nose normal.   Eyes: EOM are normal.   Cardiovascular:  Intact distal pulses.            Pulmonary/Chest: Effort normal. No respiratory distress.   Neurological: He is alert and oriented to person, place, and time.   Psychiatric: He has a normal mood and affect. His behavior is normal. Judgment and thought content normal.         Right Shoulder Exam     Inspection/Observation   Swelling: absent  Bruising: absent  Scars: absent  Deformity: absent  Scapular Winging: absent  Scapular Dyskinesia: negative  Atrophy: absent    Tenderness   The patient is experiencing no tenderness.    Range of Motion   Active abduction:  170   Passive abduction:  170   Forward Flexion:  170   Forward Elevation: 170  External Rotation 0 degrees:  50   Internal  rotation 0 degrees:  Lumbar     Tests & Signs   Apprehension: negative  Cross arm: negative  Parish test: negative  Impingement: negative  Sulcus: absent  Lift Off Sign: negative  Belly Press: negative  Active Compression Test ([place]'s Sign): positive  Speed's Test: negative  Anterior Drawer Test: 0   Posterior Drawer Test: 1+  Relocation 90 degrees: negative  Relocation > 90 degrees: negative  Bear Hug: negative  Jerk Test: negative    Other   Sensation: normal    Left Shoulder Exam     Inspection/Observation   Swelling: absent  Bruising: absent  Scars: absent  Deformity: absent  Scapular Winging: absent  Scapular Dyskinesia: negative  Atrophy: absent    Tenderness   The patient is experiencing no tenderness.     Range of Motion   Active abduction:  170   Passive abduction:  170   Forward Flexion:  180   Forward Elevation: 180  External Rotation 0 degrees:  60   Internal rotation 0 degrees:  Mid thoracic     Tests & Signs   Apprehension: negative  Sulcus: absent  Anterior Drawer Test: 0  Posterior Drawer Test: 0  Relocation 90 degrees: negative  Relocation > 90 degrees: negative  Jerk Test: negative    Other   Sensation: normal       Muscle Strength   Right Upper Extremity   Shoulder Abduction: 5/5   Shoulder Internal Rotation: 5/5   Shoulder External Rotation: 5/5   Supraspinatus: 5/5   Subscapularis: 5/5   Biceps: 5/5   Left Upper Extremity  Shoulder Abduction: 5/5   Shoulder Internal Rotation: 5/5   Shoulder External Rotation: 5/5   Supraspinatus: 5/5   Subscapularis: 5/5   Biceps: 5/5     Vascular Exam     Right Pulses      Radial:                    2+      Left Pulses      Radial:                    2+      Capillary Refill  Right Hand: normal capillary refill  Left Hand: normal capillary refill            Imaging:   X-rays of the right shoulder from 04/28/2025 personally viewed by me on that day these include AP, Grashey, scapular Y, axillary.  Glenohumeral joint is reduced.  No fracture.  No bony  abnormality.  Minimal AC joint arthritic change.      Assessment:     Ash Hastings is a 50 y.o. male with right posterior shoulder instability, likely SLAP tear with posterior extension  Encounter Diagnosis   Name Primary?    Shoulder instability, right Yes            Plan:       Assessment & Plan    PROCEDURES:  - # Procedures    FOLLOW UP:  - Follow up in 8-10 weeks.    PATIENT INSTRUCTIONS:  - Continue home PT routine 4-5 times per week.  - Gradually increase activity as tolerated.  - Avoid overhead or lateral presses.         This note was generated with the assistance of ambient listening technology. Verbal consent was obtained by the patient and accompanying visitor(s) for the recording of patient appointment to facilitate this note. I attest to having reviewed and edited the generated note for accuracy, though some syntax or spelling errors may persist. Please contact the author of this note for any clarification.                           [1]   Current Outpatient Medications on File Prior to Visit   Medication Sig Dispense Refill    fluticasone (FLONASE) 50 mcg/actuation nasal spray 1 spray by Each Nare route once daily.      salicylic acid, bulk, Powd Compound salicylic acid 70% + fluorouracil 5% paste. Apply a thin layer to warts qhs and occlude with tape. 15 g 1     No current facility-administered medications on file prior to visit.   [2]   Social History  Socioeconomic History    Marital status:    Tobacco Use    Smoking status: Never   Substance and Sexual Activity    Alcohol use: Yes     Alcohol/week: 0.0 standard drinks of alcohol     Comment: 3 beers per week    Drug use: No   Social History Narrative     in 2000.  Son and daughter.  Works as RN at [employer].  Grew up in [place].     Social Drivers of Health     Financial Resource Strain: Low Risk  (6/10/2025)    Overall Financial Resource Strain (CARDI)     Difficulty of Paying Living Expenses: Not hard at all   Food  Insecurity: No Food Insecurity (6/10/2025)    Hunger Vital Sign     Worried About Running Out of Food in the Last Year: Never true     Ran Out of Food in the Last Year: Never true   Transportation Needs: No Transportation Needs (6/10/2025)    PRAPARE - Transportation     Lack of Transportation (Medical): No     Lack of Transportation (Non-Medical): No   Physical Activity: Sufficiently Active (6/10/2025)    Exercise Vital Sign     Days of Exercise per Week: 3 days     Minutes of Exercise per Session: 60 min   Stress: No Stress Concern Present (6/10/2025)    [employer] [place] [employer] - Occupational Stress Questionnaire     Feeling of Stress : Only a little   Housing Stability: Low Risk  (6/10/2025)    Housing Stability Vital Sign     Unable to Pay for Housing in the Last Year: No     Number of Times Moved in the Last Year: 0     Homeless in the Last Year: No

## 2025-07-10 ENCOUNTER — CLINICAL SUPPORT (OUTPATIENT)
Dept: REHABILITATION | Facility: HOSPITAL | Age: 50
End: 2025-07-10
Payer: COMMERCIAL

## 2025-07-10 DIAGNOSIS — M25.311 SHOULDER INSTABILITY, RIGHT: Primary | ICD-10-CM

## 2025-07-10 PROCEDURE — 97530 THERAPEUTIC ACTIVITIES: CPT

## 2025-07-10 PROCEDURE — 97112 NEUROMUSCULAR REEDUCATION: CPT

## 2025-07-10 NOTE — PROGRESS NOTES
"  Outpatient Rehab    Physical Therapy Progress Note    Patient Name: Ash Hastings  MRN: [id number]  YOB: 1975  Encounter Date: 7/10/2025    Therapy Diagnosis:   Encounter Diagnosis   Name Primary?    Shoulder instability, right Yes     Physician: Alex Smith MD    Physician Orders: Eval and Treat  Medical Diagnosis: Shoulder instability, right    Visit # / Visits Authorized:  6 / 20  Insurance Authorization Period: 5/2/2025 to 12/31/2025  Date of Evaluation: 5/2/2025  Plan of Care Certification:       Time In: 1500   Time Out: 1600  Total Time (in minutes): 60   Total Billable Time (in minutes):      FOTO:  Intake Score:  %  Survey Score 2:  %  Survey Score 3:  %    Precautions:       Subjective   Pt reports having less pain, but noticing symptoms more frequently..         Objective      Shoulder Strength - Planes of Motion   Right Strength Right Pain Left Strength Left  Pain   Flexion           Extension           ABduction           ADduction           Horizontal ABduction           Horizontal ADduction           Internal Rotation 0°           Internal Rotation 90°           External Rotation 0°           External Rotation 90°               Shoulder Strength - Scapular Stabilizing Muscles   Right Strength Right Pain Left Strength Left  Pain   Lower Trapezius 4+   5     Middle Trapezius 4+   4+     Rhomboids                          Treatment:   Ash participated in neuromuscular re-education activities to improve: Coordination, Kinesthetic, Sense, Proprioception, and Posture for 10 minutes. The following activities were included:  SA foam roller gtb 2x10 with 5 sec hold-np  Lower trap lifts off foam in colleen pose 3x10 with 3 sec hold B-np  Seated IR dbl gtb 3x10 to 5 sec tempo B-np  Stir the pot with SB x10 cw and ccw x5 sets-np  Shoulder I's, T"s and Y's RUE GTB 3x10-np  90/90 shoulder step out with rotation gtb 2x15-np  Prone pillow w and I's 3x10 5#  1/2 foam pec mobility x5 min-np    Ash " participated in dynamic functional therapeutic activities to improve functional performance for 50 minutes, including:  UBE 5'/5' push/pull   Ball on wall shoulder red - 4 ways 3x30 ea-np  Reverse bear crawls x2 laps  Waiters carry 10# kb upside down hold x3 laps on turf B-np  Ivorian get up 5#kb 4x fatigue  Kneeling landmine press (45#) step up 4x fatigue  Pt education and HEP review  PT reassessment  FOTO      Time Entry(in minutes):  Neuromuscular Re-Education Time Entry: 10  Therapeutic Activity Time Entry: 50    Assessment & Plan   Assessment: Upon reassessment, pt showing improved scapular stability despite worse subjective score on FOTO. Will continue 1 more month of PT to address remaining deficits before return to MD.   Evaluation/Treatment Tolerance: Patient tolerated treatment well    The patient will continue to benefit from skilled outpatient physical therapy in order to address the deficits listed in the problem list on the initial evaluation, provide patient and family education, and maximize the patients level of independence in the home and community environments.     The patient's spiritual, cultural, and educational needs were considered, and the patient is agreeable to the plan of care and goals.           Plan: Continue POC per pt tolerance.    Goals:   Active       Functional outcome       Patient stated goal: Return to lifting painfree  (Progressing)       Start:  05/02/25    Expected End:  06/27/25            Patient will demonstrate independence in home program for support of progression (Met)       Start:  05/02/25    Expected End:  05/30/25    Resolved:  06/06/25            Lifting & carrying objects       Patient will lift 30 lbs OH painfree (Progressing)       Start:  05/02/25    Expected End:  06/27/25               Pain       Patient will report pain of 1/10 demonstrating a reduction of overall pain as worst (Progressing)       Start:  05/02/25    Expected End:  06/27/25                Strength       Patient will achieve bilateral shoulder mid and lower trap strength of 5/5 (Progressing)       Start:  05/02/25    Expected End:  06/27/25            Patient will achieve right shoulder external rotation strength of 5/5 in neutral       Start:  05/02/25    Expected End:  06/27/25                Viri Chan, PT

## 2025-07-17 ENCOUNTER — CLINICAL SUPPORT (OUTPATIENT)
Dept: REHABILITATION | Facility: HOSPITAL | Age: 50
End: 2025-07-17
Payer: COMMERCIAL

## 2025-07-17 DIAGNOSIS — M25.311 SHOULDER INSTABILITY, RIGHT: Primary | ICD-10-CM

## 2025-07-17 PROCEDURE — 97530 THERAPEUTIC ACTIVITIES: CPT

## 2025-07-17 PROCEDURE — 97112 NEUROMUSCULAR REEDUCATION: CPT

## 2025-07-17 NOTE — PROGRESS NOTES
"  Outpatient Rehab    Physical Therapy Progress Note    Patient Name: Ash Hastings  MRN: [id number]  YOB: 1975  Encounter Date: 7/17/2025    Therapy Diagnosis:   Encounter Diagnosis   Name Primary?    Shoulder instability, right Yes     Physician: Alex Smith MD    Physician Orders: Eval and Treat  Medical Diagnosis: Shoulder instability, right    Visit # / Visits Authorized:  7 / 20  Insurance Authorization Period: 5/2/2025 to 12/31/2025  Date of Evaluation: 5/2/2025  Plan of Care Certification:       Time In: 1315   Time Out: 1400  Total Time (in minutes): 45   Total Billable Time (in minutes):      FOTO:  Intake Score:  %  Survey Score 2:  %  Survey Score 3:  %    Precautions:       Subjective   He felt good with more prone work..         Objective            Treatment:   Ash participated in neuromuscular re-education activities to improve: Coordination, Kinesthetic, Sense, Proprioception, and Posture for 25 minutes. The following activities were included:  SA foam roller gtb 2x10 with 5 sec hold-np  Lower trap lifts off foam in colleen pose 3x10 with 3 sec hold B-np  Seated IR dbl gtb 3x10 to 5 sec tempo B-np  Stir the pot with SB x10 cw and ccw x5 sets-np  Shoulder I's, T"s and Y's RUE GTB 3x10  90/90 shoulder step out with rotation gtb 2x15 ER  Prone pillow w and I's 3x10 5#  Prone swimmer's x15  1/2 foam pec mobility x5 min    Ash participated in dynamic functional therapeutic activities to improve functional performance for 15 minutes, including:  UBE 3'/3' push/pull   Ball on wall shoulder yellow in quadruped- 4 ways 3x30 ea-  Reverse bear crawls x2 laps-np  Waiters carry 10# kb upside down hold x3 laps on turf B-np  Italian get up 5#kb 4x fatigue-np  Kneeling landmine press (45#) step up 4x fatigue-np  Pt education and HEP review  PT reassessment-np  FOTO-np      Time Entry(in minutes):  Neuromuscular Re-Education Time Entry: 25  Therapeutic Activity Time Entry: 15    Assessment & Plan "   Assessment: Pt treatment focused on posterior cuff strengthening and scapular stability for which he responded well. Will continue to load as tolerated.   Evaluation/Treatment Tolerance: Patient tolerated treatment well    The patient will continue to benefit from skilled outpatient physical therapy in order to address the deficits listed in the problem list on the initial evaluation, provide patient and family education, and maximize the patients level of independence in the home and community environments.     The patient's spiritual, cultural, and educational needs were considered, and the patient is agreeable to the plan of care and goals.           Plan: Continue POC per pt tolerance.    Goals:   Active       Functional outcome       Patient stated goal: Return to lifting painfree  (Progressing)       Start:  05/02/25    Expected End:  08/14/25            Patient will demonstrate independence in home program for support of progression (Met)       Start:  05/02/25    Expected End:  05/30/25    Resolved:  06/06/25            Lifting & carrying objects       Patient will lift 30 lbs OH painfree (Progressing)       Start:  05/02/25    Expected End:  08/14/25               Strength       Patient will achieve bilateral shoulder mid and lower trap strength of 5/5 (Progressing)       Start:  05/02/25    Expected End:  08/14/25            Patient will achieve right shoulder external rotation strength of 5/5 in neutral (Progressing)       Start:  05/02/25    Expected End:  08/14/25              Resolved       Pain       Patient will report pain of 1/10 demonstrating a reduction of overall pain as worst (Met)       Start:  05/02/25    Expected End:  06/27/25    Resolved:  07/17/25             Viri Chan, PT

## 2025-07-25 ENCOUNTER — CLINICAL SUPPORT (OUTPATIENT)
Dept: REHABILITATION | Facility: HOSPITAL | Age: 50
End: 2025-07-25
Payer: COMMERCIAL

## 2025-07-25 DIAGNOSIS — M25.311 SHOULDER INSTABILITY, RIGHT: Primary | ICD-10-CM

## 2025-07-25 PROCEDURE — 97530 THERAPEUTIC ACTIVITIES: CPT

## 2025-07-25 PROCEDURE — 97112 NEUROMUSCULAR REEDUCATION: CPT

## 2025-07-25 NOTE — PROGRESS NOTES
"  Outpatient Rehab    Physical Therapy Progress Note    Patient Name: Ash Hastings  MRN: [id number]  YOB: 1975  Encounter Date: 7/25/2025    Therapy Diagnosis:   Encounter Diagnosis   Name Primary?    Shoulder instability, right Yes     Physician: Alex Smith MD    Physician Orders: Eval and Treat  Medical Diagnosis: Shoulder instability, right    Visit # / Visits Authorized:  8 / 20  Insurance Authorization Period: 5/2/2025 to 12/31/2025  Date of Evaluation: 5/2/2025  Plan of Care Certification:       Time In: 0800   Time Out: 0910  Total Time (in minutes): 70   Total Billable Time (in minutes):      FOTO:  Intake Score:  %  Survey Score 2:  %  Survey Score 3:  %    Precautions:       Subjective   He is feeling better with current exercises..         Objective            Treatment:   Ash participated in neuromuscular re-education activities to improve: Coordination, Kinesthetic, Sense, Proprioception, and Posture for 55 minutes. The following activities were included:  OH KB carry 10#kb B x3 laps  Y's on SB with pole 3x10  SA foam roller gtb 2x10 with 5 sec hold-np  Lower trap lifts off foam in colleen pose 3x10 with 3 sec hold B-np  Seated IR dbl gtb 3x10 to 5 sec tempo B-np  Stir the pot with SB x10 cw and ccw x5 sets-np  Shoulder I's, T"s and Y's BUE GTB 3x10  90/90 shoulder step out with rotation gtb 2x15 ER  Prone pillow w and I's 3x10 5#  Prone swimmer's x15  1/2 foam pec mobility x5 min    Ash participated in dynamic functional therapeutic activities to improve functional performance for 15 minutes, including:  UBE 5'/5' push/pull   Ball on wall shoulder yellow in quadruped- 4 ways 3x30 ea-np  Reverse bear crawls x2 laps-np  Waiters carry 10# kb upside down hold x3 laps on turf B-np  [place] get up 5#kb 4x fatigue-np  Kneeling landmine press (45#) step up 4x fatigue-np  Pt education and HEP review  PT reassessment-np  FOTO-np      Time Entry(in minutes):  Neuromuscular Re-Education Time " Entry: 55  Therapeutic Activity Time Entry: 15    Assessment & Plan   Assessment: Pt tolerated more OH stability loading today, but noted biceps sensation lowering from OH. Plan to continue progressing with current tx regime since he is having good carry over into ADLs.  Evaluation/Treatment Tolerance: Patient tolerated treatment well    The patient will continue to benefit from skilled outpatient physical therapy in order to address the deficits listed in the problem list on the initial evaluation, provide patient and family education, and maximize the patients level of independence in the home and community environments.     The patient's spiritual, cultural, and educational needs were considered, and the patient is agreeable to the plan of care and goals.           Plan: Continue POC per pt tolerance.    Goals:   Active       Functional outcome       Patient stated goal: Return to lifting painfree  (Progressing)       Start:  05/02/25    Expected End:  08/14/25            Patient will demonstrate independence in home program for support of progression (Met)       Start:  05/02/25    Expected End:  05/30/25    Resolved:  06/06/25            Lifting & carrying objects       Patient will lift 30 lbs OH painfree (Progressing)       Start:  05/02/25    Expected End:  08/14/25               Strength       Patient will achieve bilateral shoulder mid and lower trap strength of 5/5 (Progressing)       Start:  05/02/25    Expected End:  08/14/25            Patient will achieve right shoulder external rotation strength of 5/5 in neutral (Progressing)       Start:  05/02/25    Expected End:  08/14/25              Resolved       Pain       Patient will report pain of 1/10 demonstrating a reduction of overall pain as worst (Met)       Start:  05/02/25    Expected End:  06/27/25    Resolved:  07/17/25             Viri Chan, PT

## 2025-08-06 ENCOUNTER — CLINICAL SUPPORT (OUTPATIENT)
Dept: REHABILITATION | Facility: HOSPITAL | Age: 50
End: 2025-08-06
Payer: COMMERCIAL

## 2025-08-06 DIAGNOSIS — M25.311 SHOULDER INSTABILITY, RIGHT: Primary | ICD-10-CM

## 2025-08-06 PROCEDURE — 97530 THERAPEUTIC ACTIVITIES: CPT | Mod: CQ

## 2025-08-06 PROCEDURE — 97112 NEUROMUSCULAR REEDUCATION: CPT | Mod: CQ

## 2025-08-06 NOTE — PROGRESS NOTES
"  Outpatient Rehab    Physical Therapy Progress Note    Patient Name: Ash Hastings  MRN: [id number]  YOB: 1975  Encounter Date: 8/6/2025    Therapy Diagnosis:   Encounter Diagnosis   Name Primary?    Shoulder instability, right Yes     Physician: Alex Smith MD    Physician Orders: Eval and Treat  Medical Diagnosis: Shoulder instability, right    Visit # / Visits Authorized:  9 / 20  Insurance Authorization Period: 5/2/2025 to 12/31/2025  Date of Evaluation: 5/2/2025  Plan of Care Certification:       Time In: 1400   Time Out: 1500  Total Time (in minutes): 60   Total Billable Time (in minutes): 60    FOTO:  Intake Score:  %  Survey Score 2:  %  Survey Score 3:  %    Precautions:       Subjective   min twing at times.  Pain reported as 1/10.      Objective            Treatment:   Ash participated in neuromuscular re-education activities to improve: Coordination, Kinesthetic, Sense, Proprioception, and Posture for 40 minutes. The following activities were included:  OH KB carry 10#kb B x3 laps  90/90 shoulder step out with rotation gtb 3x15 ER  Y's on SB with pole 3x15  B BTB [place] pulls 3x10   B Body blade shld flexed 90' up/down and side/side 3x30" ea     Shoulder I's, T"s and Y's BUE GTB 3x10  Prone pillow w and I's 3x10 5#  Prone swimmer's x15  1/2 foam pec mobility x5 min    Ash participated in dynamic functional therapeutic activities to improve functional performance for 15 minutes, including:  UBE 5'/5' push/pull   Kneeling landmine press (45#) step up 4x fatigue      Ball on wall shoulder yellow in quadruped- 4 ways 3x30 ea-np  Reverse bear crawls x2 laps-np  Waiters carry 10# kb upside down hold x3 laps on turf B-np  Polish get up 5#kb 4x fatigue-np  Kneeling landmine press (45#) step up 4x fatigue-np  Pt education and HEP review  PT reassessment-np  FOTO-np      Time Entry(in minutes):       Assessment & Plan   Assessment: Pt tolerated tx well. VC/TC for correcting form/technique. " Progress as tolerated.   Evaluation/Treatment Tolerance: Patient tolerated treatment well    The patient will continue to benefit from skilled outpatient physical therapy in order to address the deficits listed in the problem list on the initial evaluation, provide patient and family education, and maximize the patients level of independence in the home and community environments.     The patient's spiritual, cultural, and educational needs were considered, and the patient is agreeable to the plan of care and goals.           Plan: Continue POC per pt tolerance.    Goals:   Active       Functional outcome       Patient stated goal: Return to lifting painfree  (Progressing)       Start:  05/02/25    Expected End:  08/14/25            Patient will demonstrate independence in home program for support of progression (Met)       Start:  05/02/25    Expected End:  05/30/25    Resolved:  06/06/25            Lifting & carrying objects       Patient will lift 30 lbs OH painfree (Progressing)       Start:  05/02/25    Expected End:  08/14/25               Strength       Patient will achieve bilateral shoulder mid and lower trap strength of 5/5 (Progressing)       Start:  05/02/25    Expected End:  08/14/25            Patient will achieve right shoulder external rotation strength of 5/5 in neutral (Progressing)       Start:  05/02/25    Expected End:  08/14/25              Resolved       Pain       Patient will report pain of 1/10 demonstrating a reduction of overall pain as worst (Met)       Start:  05/02/25    Expected End:  06/27/25    Resolved:  07/17/25             Mateusz Farfan, PTA, STS

## 2025-08-07 ENCOUNTER — TELEPHONE (OUTPATIENT)
Dept: SPORTS MEDICINE | Facility: CLINIC | Age: 50
End: 2025-08-07
Payer: COMMERCIAL

## 2025-08-07 NOTE — TELEPHONE ENCOUNTER
Called and left voicemail to reschedule 8/18/25 appointment to a virtual visit on 8/13/25 or 8/12/25 due to Dr Smith being out of the office on 8/18/25

## 2025-08-11 ENCOUNTER — PATIENT MESSAGE (OUTPATIENT)
Dept: SPORTS MEDICINE | Facility: CLINIC | Age: 50
End: 2025-08-11
Payer: COMMERCIAL

## 2025-08-11 ENCOUNTER — TELEPHONE (OUTPATIENT)
Dept: SPORTS MEDICINE | Facility: CLINIC | Age: 50
End: 2025-08-11
Payer: COMMERCIAL

## 2025-08-12 ENCOUNTER — CLINICAL SUPPORT (OUTPATIENT)
Dept: REHABILITATION | Facility: HOSPITAL | Age: 50
End: 2025-08-12
Payer: COMMERCIAL

## 2025-08-12 ENCOUNTER — OFFICE VISIT (OUTPATIENT)
Dept: SPORTS MEDICINE | Facility: CLINIC | Age: 50
End: 2025-08-12
Payer: COMMERCIAL

## 2025-08-12 DIAGNOSIS — M25.311 SHOULDER INSTABILITY, RIGHT: Primary | ICD-10-CM

## 2025-08-12 PROCEDURE — 97530 THERAPEUTIC ACTIVITIES: CPT

## 2025-08-12 PROCEDURE — 97112 NEUROMUSCULAR REEDUCATION: CPT

## 2025-08-20 ENCOUNTER — CLINICAL SUPPORT (OUTPATIENT)
Dept: REHABILITATION | Facility: HOSPITAL | Age: 50
End: 2025-08-20
Payer: COMMERCIAL

## 2025-08-20 DIAGNOSIS — M25.311 SHOULDER INSTABILITY, RIGHT: Primary | ICD-10-CM

## 2025-08-20 PROCEDURE — 97112 NEUROMUSCULAR REEDUCATION: CPT | Mod: CQ

## 2025-08-20 PROCEDURE — 97530 THERAPEUTIC ACTIVITIES: CPT | Mod: CQ

## 2025-08-27 ENCOUNTER — CLINICAL SUPPORT (OUTPATIENT)
Dept: REHABILITATION | Facility: HOSPITAL | Age: 50
End: 2025-08-27
Payer: COMMERCIAL

## 2025-08-27 DIAGNOSIS — M25.311 SHOULDER INSTABILITY, RIGHT: Primary | ICD-10-CM

## 2025-08-27 PROCEDURE — 97112 NEUROMUSCULAR REEDUCATION: CPT | Mod: CQ

## 2025-08-27 PROCEDURE — 97530 THERAPEUTIC ACTIVITIES: CPT | Mod: CQ

## 2025-09-02 ENCOUNTER — CLINICAL SUPPORT (OUTPATIENT)
Dept: REHABILITATION | Facility: HOSPITAL | Age: 50
End: 2025-09-02
Payer: COMMERCIAL

## 2025-09-02 DIAGNOSIS — M25.311 SHOULDER INSTABILITY, RIGHT: Primary | ICD-10-CM

## 2025-09-02 PROCEDURE — 97530 THERAPEUTIC ACTIVITIES: CPT

## 2025-09-02 PROCEDURE — 97112 NEUROMUSCULAR REEDUCATION: CPT
